# Patient Record
Sex: FEMALE | Race: WHITE | NOT HISPANIC OR LATINO | Employment: FULL TIME | URBAN - METROPOLITAN AREA
[De-identification: names, ages, dates, MRNs, and addresses within clinical notes are randomized per-mention and may not be internally consistent; named-entity substitution may affect disease eponyms.]

---

## 2021-01-29 ENCOUNTER — OFFICE VISIT (OUTPATIENT)
Dept: OBSTETRICS AND GYNECOLOGY | Facility: CLINIC | Age: 41
End: 2021-01-29

## 2021-01-29 VITALS
HEIGHT: 68 IN | WEIGHT: 134.2 LBS | SYSTOLIC BLOOD PRESSURE: 120 MMHG | DIASTOLIC BLOOD PRESSURE: 70 MMHG | BODY MASS INDEX: 20.34 KG/M2

## 2021-01-29 DIAGNOSIS — N89.8 VAGINAL DISCHARGE: ICD-10-CM

## 2021-01-29 DIAGNOSIS — Z30.431 IUD CHECK UP: ICD-10-CM

## 2021-01-29 DIAGNOSIS — N93.0 POSTCOITAL BLEEDING: Primary | ICD-10-CM

## 2021-01-29 PROBLEM — D21.9 FIBROIDS: Status: ACTIVE | Noted: 2021-01-29

## 2021-01-29 LAB
KOH PREP NAIL: NORMAL
WET PREP GENITAL: NORMAL

## 2021-01-29 PROCEDURE — 99203 OFFICE O/P NEW LOW 30 MIN: CPT | Performed by: NURSE PRACTITIONER

## 2021-01-29 PROCEDURE — 87220 TISSUE EXAM FOR FUNGI: CPT | Performed by: NURSE PRACTITIONER

## 2021-01-29 PROCEDURE — 87210 SMEAR WET MOUNT SALINE/INK: CPT | Performed by: NURSE PRACTITIONER

## 2021-07-28 ENCOUNTER — OFFICE VISIT (OUTPATIENT)
Dept: OBSTETRICS AND GYNECOLOGY | Facility: CLINIC | Age: 41
End: 2021-07-28

## 2021-07-28 VITALS
BODY MASS INDEX: 19.85 KG/M2 | HEIGHT: 68 IN | WEIGHT: 131 LBS | SYSTOLIC BLOOD PRESSURE: 128 MMHG | DIASTOLIC BLOOD PRESSURE: 88 MMHG

## 2021-07-28 DIAGNOSIS — Z30.431 IUD CHECK UP: ICD-10-CM

## 2021-07-28 DIAGNOSIS — N93.0 POSTCOITAL BLEEDING: ICD-10-CM

## 2021-07-28 DIAGNOSIS — D21.9 FIBROIDS: ICD-10-CM

## 2021-07-28 DIAGNOSIS — Z01.419 PAP TEST, AS PART OF ROUTINE GYNECOLOGICAL EXAMINATION: Primary | ICD-10-CM

## 2021-07-28 PROCEDURE — 99395 PREV VISIT EST AGE 18-39: CPT | Performed by: OBSTETRICS & GYNECOLOGY

## 2021-07-28 RX ORDER — MEDROXYPROGESTERONE ACETATE 10 MG/1
10 TABLET ORAL DAILY
Qty: 30 TABLET | Refills: 0 | Status: SHIPPED | OUTPATIENT
Start: 2021-07-28 | End: 2021-08-27

## 2021-07-28 NOTE — PROGRESS NOTES
GYN Annual Exam     CC - Here for annual exam.   Follow up spotting after intercourse  Subjective   HPI  Cathi Hsu is a 41 y.o. female, , who presents for annual well woman exam.   Patient's last menstrual period was 2021 (exact date)..  Periods are regular every 28-30 days, lasting 3 days.  Dysmenorrhea:mild, occurring first 1-2 days of flow. She has some heavy flow with her periods.  Patient reports problems with: spotting after intercourse 85% of the time, trace amout of bleed to signicant bleeding.  Partner Status: Marital Status: .  New Partners since last visit: no.  Desires STD Screening: no.    Additional OB/GYN History   Current contraception: paraguard  Last Pap :   Last Completed Pap Smear     This patient has no relevant Health Maintenance data.        History of abnormal Pap smear: yes  Family history of uterine, colon, breast, or ovarian cancer: no  Performs monthly Self-Breast Exam: yes  Last mammogram:   Last Completed Mammogram     This patient has no relevant Health Maintenance data.        Feelings of Anxiety or Depression: no  Tobacco Usage?: No   OB History        2    Para   2    Term   2            AB        Living   2       SAB        TAB        Ectopic        Molar        Multiple        Live Births   2                Health Maintenance   Topic Date Due   • Annual Gynecologic Pelvic and Breast Exam  Never done   • ANNUAL PHYSICAL  Never done   • COVID-19 Vaccine (1) Never done   • TDAP/TD VACCINES (1 - Tdap) Never done   • HEPATITIS C SCREENING  Never done   • PAP SMEAR  2021   • INFLUENZA VACCINE  10/01/2021   • Pneumococcal Vaccine 0-64  Aged Out         Current Outpatient Medications:   •  PARAGARD INTRAUTERINE COPPER IU, by Intrauterine route. Inserted 2020, Disp: , Rfl:     The additional following portions of the patient's history were reviewed and updated as appropriate: current medications, past family history, past medical history,  "past social history, past surgical history and problem list.    Review of Systems   Constitutional: Negative.    HENT: Negative.    Eyes: Negative.    Respiratory: Negative.    Cardiovascular: Negative.    Gastrointestinal: Negative.    Endocrine: Negative.    Genitourinary: Positive for vaginal bleeding.   Musculoskeletal: Negative.    Skin: Negative.    Allergic/Immunologic: Negative.    Neurological: Negative.    Hematological: Negative.    Psychiatric/Behavioral: Negative.        I have reviewed and agree with the HPI, ROS, and historical information as entered above. Addi Pompa MD    Objective   /88   Ht 172.7 cm (68\")   Wt 59.4 kg (131 lb)   LMP 07/14/2021 (Exact Date)   Breastfeeding No   BMI 19.92 kg/m²     PE    Breast: Without masses ,nontender, no skin changes or retractions  Axilla: Normal, no lymphadenopathy  Heart: Regular rate no murmurs rubs or gallops  Lungs: Clear to auscultation, normal breath sounds bilaterally  Abdomen: Soft nontender, no hepatosplenomegaly, no guarding or rebound, no masses  Pelvic exam  External genitalia: Normal introitus and vulva  Vagina: Normal mucosa no bleeding inflammation or discharge  Bladder: Normal position nontender  Urethral meatus and urethra: Normal nontender  Cervix: No lesions, no discharge, bleeding or inflammation, IUD string visible  Bimanual: Nontender adnexa clear, no sign of ovarian enlargement, uterus upper normal size consistent with small leiomyoma  Anal: No external lesions or hemorrhoids    Assessment/Plan     Assessment     Problem List Items Addressed This Visit        Genitourinary and Reproductive     Postcoital bleeding    IUD check up       Hematology and Neoplasia    Fibroids    Overview     1/29/21- two that are 2cm           Other Visit Diagnoses     Pap test, as part of routine gynecological examination    -  Primary    Relevant Orders    Pap IG, HPV-hr        Trial of Provera 10 mg daily for 30-day  1. GYN annual well " woman exam.   2. If bleeding persist we will need to remove her IUD    Plan     1. Next pap due in: 3 years  Annual exam today  Preventative health initiatives reviewed  Trial of Provera possible IUD removal in 30-day  Addi Pompa MD  07/28/2021

## 2021-12-10 ENCOUNTER — OFFICE VISIT (OUTPATIENT)
Dept: OBSTETRICS AND GYNECOLOGY | Facility: CLINIC | Age: 41
End: 2021-12-10

## 2021-12-10 VITALS
SYSTOLIC BLOOD PRESSURE: 128 MMHG | HEIGHT: 68 IN | DIASTOLIC BLOOD PRESSURE: 84 MMHG | BODY MASS INDEX: 20.43 KG/M2 | WEIGHT: 134.8 LBS

## 2021-12-10 DIAGNOSIS — N93.0 POSTCOITAL BLEEDING: ICD-10-CM

## 2021-12-10 PROCEDURE — 58301 REMOVE INTRAUTERINE DEVICE: CPT | Performed by: NURSE PRACTITIONER

## 2021-12-10 NOTE — PROGRESS NOTES
IUD Removal Procedure Note    Procedures    Type of IUD:  ParaGard   Date of insertion:  01/31/2020  Reason for removal:  Side effect: Excessive bleeding during intercourse.    Procedure Time Out Documentation    Procedure Details  IUD strings visible:  yes  Removal:  IUD strings grasped and IUD removed intact with gentle traction.  The patient tolerated the procedure well.    All appropriate instructions regarding removal were reviewed.    Tolerated well  No apparent complications  Cervix appears healthy.  Post procedure diagnosis : IUD removal     Plans for contraception:  phexxi for now, she does not really want hormones. We discussed a kyleena or mirena. She is recently  and he does not have children, they have not 100% ruled it out though so don't want anything permanent yet.  I gave her 6 samples of phexxi, advised on efficacy and that condoms can be used with it.     The patient was advised to call for any fever or for prolonged or severe pain or bleeding. She was advised to use motrin as needed for mild to moderate pain.   Scheduled to return for colpo that was recommended after her last pap 7/2021 for ASCUS HPV non 16/18 pos.  Call if postcoital bleeding persists.     Antoine Quiros MA  12/10/2021

## 2022-01-05 NOTE — PROGRESS NOTES
"Chief Complaint   Patient presents with   • Follow-up     abnormal vaginal discharge, bleeding with intercourse       Subjective   HPI  Cathi Hsu is a 40 y.o. female, , who presents for bleeding with intercourse.    She states she has experienced this problem for 1 year. Paragard inserted 2020. She describes the severity as \"substantial\"; notices with wiping post-coitus and a pad/pantiliner not needed afterwards.  She states that the problem is recurrent.     Presented to local clinic 1 month ago for abnormal vaginal discharge; bacterial vaginosis resulted, with yeast. STD screening on that day negative.    Today, patient reporting clear vaginal discharge with no odor. Slight itch noted.    Her last LMP was Patient's last menstrual period was 01/10/2021 (within days)..  Periods are regular every 28-30 days, lasting 3-4 days, described as heavier than usual with occasional clots. Changes tampon every 2-3 hours on her heaviest days.  Dysmenorrhea: mild, occurring first 1-2 days of flow. Partner Status: Marital Status: single.  New Partners since last visit: no.  Desires STD Screening: no.    Additional OB/GYN History   Current contraception: contraceptive methods: IUD.  Insertion date: 2020 - Paragard  Desires to: continue contraception  Last Pap :   Last Completed Pap Smear       Status Date      PAP SMEAR Done 2019 negative; last HPV regardless screening 2017        History of abnormal Pap smear: yes - HPV non16/18+  Last mammogram:   Last Completed Mammogram     Patient has no health maintenance due at this time        Tobacco Usage?: No   OB History        2    Para   2    Term   2            AB        Living   2       SAB        TAB        Ectopic        Molar        Multiple        Live Births   2                Health Maintenance   Topic Date Due   • Annual Gynecologic Pelvic and Breast Exam  1982   • ANNUAL PHYSICAL  02/10/1985   • TDAP/TD VACCINES (1 - Tdap) " "02/10/2001   • INFLUENZA VACCINE  08/01/2020   • HEPATITIS C SCREENING  01/29/2021   • PAP SMEAR  01/29/2021   • Pneumococcal Vaccine 0-64  Aged Out   • MENINGOCOCCAL VACCINE  Aged Out       The additional following portions of the patient's history were reviewed and updated as appropriate: allergies, current medications, past family history, past medical history, past social history, past surgical history and problem list.    Review of Systems   Constitutional: Negative.    Genitourinary: Positive for vaginal bleeding (with intercourse) and vaginal discharge.   All other systems reviewed and are negative.      I have reviewed and agree with the HPI, ROS, and historical information as entered above. Sarahy Ruiz, APRN    Objective   /70 (BP Location: Right arm, Patient Position: Sitting, Cuff Size: Adult)   Ht 172.7 cm (68\")   Wt 60.9 kg (134 lb 3.2 oz)   LMP 01/10/2021 (Within Days)   Breastfeeding No   BMI 20.41 kg/m²     Physical Exam  Vitals signs and nursing note reviewed. Exam conducted with a chaperone present.   Constitutional:       Appearance: Normal appearance.   Genitourinary:     Labia:         Right: No rash, tenderness or lesion.         Left: No rash, tenderness or lesion.       Vagina: Vaginal discharge present. No lesions.      Cervix: No cervical motion tenderness, discharge, lesion or cervical bleeding.      Uterus: Normal. Not enlarged, not fixed and not tender.       Adnexa:         Right: No mass or tenderness.          Left: No mass or tenderness.        Rectum: No external hemorrhoid.      Comments: White discharge. IUD strings visualized and appropriate. Chaperone Present  Neurological:      Mental Status: She is alert.         Assessment/Plan     Assessment     Problem List Items Addressed This Visit        Other    Postcoital bleeding - Primary    Relevant Orders    US Non-ob Transvaginal      Other Visit Diagnoses     IUD check up        Vaginal discharge        Relevant " Orders    POC Wet Prep (Completed)    POC KOH Prep (Completed)          1. U/S reveals IUD is correctly placed at the fundus. Two 2 cm fibroids which were 1 cm each 1 year ago. She has had culture proven recurrent BV and yeast and negative STD cultures at [facility] recently. Infections intend to occur post menses. She was recently treated with resolution of vaginal symptoms but with persistent postcoital bleeding. WP/KOH negative today.    Plan     Return in about 4 weeks (around 2/26/2021) for Annual physical RWO and postcoital bldg F/U.   1. We discussed Luvena 2x weekly to balance PH. She would prefer to do that prior to prophylactic metrogel and diflucan.         Sarahy Ruiz, APRN  01/29/2021   not examined

## 2022-03-07 PROBLEM — Z87.42 HISTORY OF ABNORMAL CERVICAL PAP SMEAR: Status: ACTIVE | Noted: 2022-03-07

## 2022-03-08 ENCOUNTER — OFFICE VISIT (OUTPATIENT)
Dept: OBSTETRICS AND GYNECOLOGY | Facility: CLINIC | Age: 42
End: 2022-03-08

## 2022-03-08 VITALS
BODY MASS INDEX: 20.46 KG/M2 | HEIGHT: 68 IN | SYSTOLIC BLOOD PRESSURE: 100 MMHG | DIASTOLIC BLOOD PRESSURE: 60 MMHG | WEIGHT: 135 LBS

## 2022-03-08 DIAGNOSIS — Z87.42 HISTORY OF ABNORMAL CERVICAL PAP SMEAR: Primary | ICD-10-CM

## 2022-03-08 DIAGNOSIS — N93.0 POSTCOITAL BLEEDING: ICD-10-CM

## 2022-03-08 PROBLEM — Z30.431 IUD CHECK UP: Status: RESOLVED | Noted: 2021-07-28 | Resolved: 2022-03-08

## 2022-03-08 PROCEDURE — 57500 BIOPSY OF CERVIX: CPT | Performed by: OBSTETRICS & GYNECOLOGY

## 2022-03-08 PROCEDURE — 99214 OFFICE O/P EST MOD 30 MIN: CPT | Performed by: OBSTETRICS & GYNECOLOGY

## 2022-03-08 NOTE — PROGRESS NOTES
Chief Complaint   Patient presents with   • Abnormal Pap Smear     F/u pap and postcoital bleeding            Cathi Hsu is a 42 y.o. year old  presenting to be seen for a PAP in follow-up of a prior abnormal PAP and/or HPV screen.    OTHER THINGS SHE WANTS TO DISCUSS TODAY:  bleeding during intercourse     She has been evaluated for bleeding during intercourse in the past.  She had a paragard IUD that they thought was contributing to her bleeding.  She had it removed in December and reports bleeding has been lighter, but still present.  She occasionally will have associated pain during intercourse as well.  Her bleeding usually resolves within 24h.  She has a h/o fibroids, last imaged in .  At that time they were 1.2 and 1.3cm, which was slightly larger than before.  She is using withdrawal now for contraception. She has a h/o recurrent BV and yeast infections that have also improved since having her paragard removed.     The additional following portions of the patient's history were reviewed and updated as appropriate: allergies, current medications, past family history, past medical history, past social history, past surgical history and problem list.    Review of Systems   Constitutional: Negative.    HENT: Negative.    Respiratory: Negative.    Cardiovascular: Negative.    Gastrointestinal: Negative.    Genitourinary: Negative.    Musculoskeletal: Negative.    Skin: Negative.    Allergic/Immunologic: Negative.    Neurological: Negative.    Hematological: Negative.    Psychiatric/Behavioral: Negative.      All other systems reviewed and are negative.     I have reviewed and agree with the HPI, ROS, and historical information as entered above. Paola Aranda MD    Physical Exam  Vitals and nursing note reviewed. Exam conducted with a chaperone present.   Genitourinary:     General: Normal vulva.      Exam position: Lithotomy position.      Labia:         Right: No rash, tenderness or  lesion.         Left: No rash, tenderness or lesion.       Urethra: No urethral pain, urethral swelling or urethral lesion.      Vagina: Normal. No tenderness or lesions.      Cervix: No cervical motion tenderness, discharge, lesion or cervical bleeding.      Uterus: Normal. Not enlarged, not fixed and not tender.       Adnexa:         Right: No mass, tenderness or fullness.          Left: No mass, tenderness or fullness.        Rectum: No external hemorrhoid.            Comments: Chaperone Present  4 mm cervical polyp noted at the 7 o'clock position.  Using ring forceps, this was removed.  Specimen sent to pathology.      Biopsy Cervix    Date/Time: 3/8/2022 11:57 AM  Performed by: Paola Aranda MD  Authorized by: Paola Aranda MD   Preparation: Patient was prepped and draped in the usual sterile fashion.  Local anesthesia used: no    Anesthesia:  Local anesthesia used: no    Sedation:  Patient sedated: no    Patient tolerance: patient tolerated the procedure well with no immediate complications        Lab Review   Last PAP on 7/21 indicated ASCUS, HPV non 16/18+. Assessment and Plan    Problem List Items Addressed This Visit        Genitourinary and Reproductive     Postcoital bleeding    Overview     Cervical polyp noted on 3/8/2022.  We will draw new swab for BV and yeast as patient has had chronic BV in the past.  Her bleeding is improved since IUD is removed however does not gone away.  Questioning whether cervical polyp is the source.  It was removed with a ring forcep and silver nitrate applied to the polyp bed.           Relevant Orders    NuSwab BV & Candida - Swab, Vagina    Tissue Pathology Exam    History of abnormal cervical Pap smear - Primary    Overview     7/28/2021-ASCUS, nonsixteen/18 HPV positive.           Relevant Orders    Pap IG, HPV-hr          1. The importance of keeping all planned follow-up and taking all medications as prescribed was emphasized.  2. We discussed the  natural history of the HPV  virus and that once we become sexually active, we collect strands of HPV from each of our sexual partners and carry it in our DNA always.  Sometimes, dips in our immune systems from smoking or illness may cause us to start having replication of the HPV virus that shows up on our pap smears.  It is important to remember that “sudden” appearance of HPV on a pap smear does not indicate infidelity in a relationship, as we do not know how long the virus has been in a patient's DNA. Once someone has an abnormal pap, we look at the cervix with a lighted microscope and use different colored solutions to highlight changes and take biopsies of those areas to make sure that the HPV virus has not caused any cellular changes that can be precancerous.  If cellular changes are found, we discuss what, if any, treatment is needed depending on the patient’s age and degree of abnormality.  In general, younger patients tent to “clear” HPV more readily than older patients.  For any patient, we want two paps 6 months apart to come back normal before we return to yearly screening.    Return in about 6 months (around 9/8/2022) for Annual physical, with repeat PAP.      Paola Aranda MD  03/08/2022

## 2022-03-10 DIAGNOSIS — A49.9 RECURRENT BACTERIAL INFECTION: Primary | ICD-10-CM

## 2022-03-10 LAB
A VAGINAE DNA VAG QL NAA+PROBE: ABNORMAL SCORE
BVAB2 DNA VAG QL NAA+PROBE: ABNORMAL SCORE
C ALBICANS DNA VAG QL NAA+PROBE: NEGATIVE
C GLABRATA DNA VAG QL NAA+PROBE: NEGATIVE
MEGA1 DNA VAG QL NAA+PROBE: ABNORMAL SCORE

## 2022-03-10 RX ORDER — BORIC ACID
600 POWDER (GRAM) MISCELLANEOUS NIGHTLY
Qty: 30 SUPPOSITORY | Refills: 0 | Status: SHIPPED | OUTPATIENT
Start: 2022-03-10 | End: 2022-04-09

## 2022-03-10 RX ORDER — METRONIDAZOLE 500 MG/1
500 TABLET ORAL 2 TIMES DAILY
Qty: 14 TABLET | Refills: 0 | Status: SHIPPED | OUTPATIENT
Start: 2022-03-10 | End: 2022-03-17

## 2022-03-15 DIAGNOSIS — Z87.42 HISTORY OF ABNORMAL CERVICAL PAP SMEAR: ICD-10-CM

## 2022-03-15 DIAGNOSIS — N93.0 POSTCOITAL BLEEDING: ICD-10-CM

## 2022-03-28 ENCOUNTER — TELEPHONE (OUTPATIENT)
Dept: OBSTETRICS AND GYNECOLOGY | Facility: CLINIC | Age: 42
End: 2022-03-28

## 2022-03-28 NOTE — TELEPHONE ENCOUNTER
Pt. Called back, states [facility] gave her a rx for the boric acid supp but she did not take them as the bottle said it can cause bleeding when using them and the BV was already causing post coital bleeding. She finished the abx she was given and was requesting a refill. Recommended she complete the boric acid suppositories for 30 days and if sx not improve or worsen, call back. She VU

## 2022-03-28 NOTE — TELEPHONE ENCOUNTER
Pt stated she has symptoms of BV again and would like for the boric acid suppositories to be prescribed again.

## 2022-12-09 ENCOUNTER — TELEPHONE (OUTPATIENT)
Dept: OBSTETRICS AND GYNECOLOGY | Facility: CLINIC | Age: 42
End: 2022-12-09